# Patient Record
Sex: MALE | Race: WHITE | Employment: OTHER | URBAN - METROPOLITAN AREA
[De-identification: names, ages, dates, MRNs, and addresses within clinical notes are randomized per-mention and may not be internally consistent; named-entity substitution may affect disease eponyms.]

---

## 2017-12-22 NOTE — PATIENT DISCUSSION
- Has been on Elinore Gosling for years without issues. Stopped taking it 1 week ago when his prescription ran out.

## 2017-12-22 NOTE — PATIENT DISCUSSION
New Prescription: Zioptan (PF) (tafluprost (pf)): dropperette: 0.0015% 1 drop at bedtime as directed into both eyes 12-

## 2018-02-02 NOTE — PATIENT DISCUSSION
Continue: Zioptan (PF) (tafluprost (pf)): dropperette: 0.0015% 1 drop at bedtime as directed into both eyes 12-

## 2018-03-01 NOTE — PATIENT DISCUSSION
New Prescription: timolol maleate (timolol maleate): drops: 0.5% 1 drop twice a day as directed into both eyes 03-

## 2018-05-01 NOTE — PATIENT DISCUSSION
Continue: timolol maleate (timolol maleate): drops: 0.5% 1 drop twice a day as directed into both eyes 03-

## 2018-09-04 NOTE — PATIENT DISCUSSION
- Goal IOP: below 17 for now OU, essentially at goal, though if any signs of progression will target below 15

## 2018-12-20 NOTE — PATIENT DISCUSSION
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  RNFL appears slightly rotated temporally, so loss on OCT may be artifactual

## 2018-12-20 NOTE — PATIENT DISCUSSION
- Borderline visually and functionally significant, starting to have issues with contrast sensitivity at night

## 2019-05-10 NOTE — PATIENT DISCUSSION
Continue: timolol maleate (timolol maleate): drops, once daily: 0.5% 1 drop once a day as directed into affected eye

## 2019-05-10 NOTE — PATIENT DISCUSSION
Continue: Zioptan (PF) (tafluprost (pf)): dropperette: 0.0015% 1 drop at bedtime as directed into both eyes 01-

## 2019-07-03 ENCOUNTER — NEW PATIENT COMPREHENSIVE (OUTPATIENT)
Dept: URBAN - METROPOLITAN AREA CLINIC 38 | Facility: CLINIC | Age: 49
End: 2019-07-03

## 2019-07-03 DIAGNOSIS — H52.4: ICD-10-CM

## 2019-07-03 DIAGNOSIS — Z46.0: ICD-10-CM

## 2019-07-03 DIAGNOSIS — H52.13: ICD-10-CM

## 2019-07-03 PROCEDURE — 92015 DETERMINE REFRACTIVE STATE: CPT

## 2019-07-03 PROCEDURE — 92310PDW PREMIUM SPECIALTY DAILY WEAR

## 2019-07-03 PROCEDURE — 92310-3 LEVEL 3 CONTACT LENS MANAGEMENT

## 2019-07-03 PROCEDURE — 92004 COMPRE OPH EXAM NEW PT 1/>: CPT

## 2019-07-03 ASSESSMENT — VISUAL ACUITY
OS_SC: 20/400
OS_CC: 20/20
OD_CC: J8
OS_CC: J6
OS_SC: J1
OD_CC: 20/20
OD_SC: J1
OD_SC: 20/200

## 2019-07-03 ASSESSMENT — KERATOMETRY
OD_K1POWER_DIOPTERS: 46.25
OS_AXISANGLE2_DEGREES: 114
OS_K2POWER_DIOPTERS: 46.5
OS_AXISANGLE_DEGREES: 24
OD_AXISANGLE2_DEGREES: 76
OD_AXISANGLE_DEGREES: 166
OD_K2POWER_DIOPTERS: 46.5
OS_K1POWER_DIOPTERS: 46

## 2019-07-03 ASSESSMENT — TONOMETRY
OS_IOP_MMHG: 22
OD_IOP_MMHG: 21

## 2019-07-12 ENCOUNTER — CONTACT LENS FOLLOW UP (OUTPATIENT)
Dept: URBAN - METROPOLITAN AREA CLINIC 38 | Facility: CLINIC | Age: 49
End: 2019-07-12

## 2019-07-12 DIAGNOSIS — Z46.0: ICD-10-CM

## 2019-07-12 PROCEDURE — 92310F

## 2019-07-12 ASSESSMENT — KERATOMETRY
OD_K2POWER_DIOPTERS: 46.5
OD_K1POWER_DIOPTERS: 46.25
OD_AXISANGLE2_DEGREES: 76
OD_AXISANGLE_DEGREES: 166
OS_K2POWER_DIOPTERS: 46.5
OS_AXISANGLE2_DEGREES: 114
OS_AXISANGLE_DEGREES: 24
OS_K1POWER_DIOPTERS: 46

## 2019-07-12 ASSESSMENT — VISUAL ACUITY
OU_CC: 20/20
OU_CC: J1

## 2020-10-15 ENCOUNTER — ESTABLISHED COMPREHENSIVE EXAM (OUTPATIENT)
Dept: URBAN - METROPOLITAN AREA CLINIC 38 | Facility: CLINIC | Age: 50
End: 2020-10-15

## 2020-10-15 DIAGNOSIS — H52.202: ICD-10-CM

## 2020-10-15 DIAGNOSIS — Z97.3: ICD-10-CM

## 2020-10-15 DIAGNOSIS — H52.4: ICD-10-CM

## 2020-10-15 DIAGNOSIS — H52.13: ICD-10-CM

## 2020-10-15 PROCEDURE — 92014 COMPRE OPH EXAM EST PT 1/>: CPT

## 2020-10-15 PROCEDURE — 92310-3 LEVEL 3 CONTACT LENS MANAGEMENT

## 2020-10-15 PROCEDURE — 92015 DETERMINE REFRACTIVE STATE: CPT

## 2020-10-15 ASSESSMENT — KERATOMETRY
OS_K2POWER_DIOPTERS: 46.5
OS_AXISANGLE2_DEGREES: 114
OD_AXISANGLE_DEGREES: 166
OS_AXISANGLE_DEGREES: 24
OD_AXISANGLE2_DEGREES: 76
OD_K2POWER_DIOPTERS: 46.5
OD_K1POWER_DIOPTERS: 46.25
OS_K1POWER_DIOPTERS: 46

## 2020-10-15 ASSESSMENT — TONOMETRY
OS_IOP_MMHG: 20
OD_IOP_MMHG: 18

## 2020-10-15 ASSESSMENT — VISUAL ACUITY
OD_CC: J12
OS_CC: 20/25
OU_CC: J3
OU_CC: 20/20
OD_CC: 20/20
OS_CC: J4

## 2020-11-24 ENCOUNTER — CONTACT LENS FOLLOW UP (OUTPATIENT)
Dept: URBAN - METROPOLITAN AREA CLINIC 38 | Facility: CLINIC | Age: 50
End: 2020-11-24

## 2020-11-24 DIAGNOSIS — Z97.3: ICD-10-CM

## 2020-11-24 PROCEDURE — 92310F

## 2020-11-24 ASSESSMENT — KERATOMETRY
OS_AXISANGLE_DEGREES: 24
OD_K1POWER_DIOPTERS: 46.25
OS_AXISANGLE2_DEGREES: 114
OD_AXISANGLE_DEGREES: 166
OD_AXISANGLE2_DEGREES: 76
OD_K2POWER_DIOPTERS: 46.5
OS_K1POWER_DIOPTERS: 46
OS_K2POWER_DIOPTERS: 46.5

## 2020-11-24 ASSESSMENT — VISUAL ACUITY
OU_CC: 20/20
OD_CC: 20/20
OU_CC: J1
OD_CC: J6
OS_CC: 20/30
OS_CC: J2

## 2021-03-09 NOTE — PATIENT DISCUSSION
- Likely exacerbated by screen time.  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

## 2022-03-08 NOTE — PATIENT DISCUSSION
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  RNFL appears slightly rotated temporally, so loss on OCT may be artifactual.

## 2022-06-29 ENCOUNTER — COMPREHENSIVE EXAM (OUTPATIENT)
Dept: URBAN - METROPOLITAN AREA CLINIC 38 | Facility: CLINIC | Age: 52
End: 2022-06-29

## 2022-06-29 DIAGNOSIS — H52.4: ICD-10-CM

## 2022-06-29 DIAGNOSIS — Z97.3: ICD-10-CM

## 2022-06-29 DIAGNOSIS — H52.13: ICD-10-CM

## 2022-06-29 DIAGNOSIS — H52.202: ICD-10-CM

## 2022-06-29 PROCEDURE — 92015 DETERMINE REFRACTIVE STATE: CPT

## 2022-06-29 PROCEDURE — 92014 COMPRE OPH EXAM EST PT 1/>: CPT

## 2022-06-29 PROCEDURE — 92310-4 LEVEL 4 CONTACT LENS MANAGEMENT

## 2022-06-29 ASSESSMENT — KERATOMETRY
OD_K2POWER_DIOPTERS: 46.5
OS_AXISANGLE_DEGREES: 24
OS_AXISANGLE2_DEGREES: 114
OS_K1POWER_DIOPTERS: 46
OD_AXISANGLE_DEGREES: 166
OD_K1POWER_DIOPTERS: 46.25
OD_AXISANGLE2_DEGREES: 76
OS_K2POWER_DIOPTERS: 46.5

## 2022-06-29 ASSESSMENT — VISUAL ACUITY
OD_CC: J8
OU_CC: 20/20
OU_CC: J1
OS_CC: J1
OD_CC: 20/20
OS_CC: 20/25

## 2022-06-29 ASSESSMENT — TONOMETRY
OS_IOP_MMHG: 18
OD_IOP_MMHG: 18

## 2022-08-02 NOTE — PATIENT DISCUSSION
OCT with evidence of RNFL loss inferiorly OS>OD, no definite progression.  RNFL appears slightly rotated temporally, so loss on OCT may be artifactual.

## 2022-08-26 NOTE — PATIENT DISCUSSION
VISUALLY SIGNIFICANT: I have discussed the option of glasses versus cataract surgery versus following. It was explained that when the patients vision no longer meets their visual needs and a glasses prescription does not improve visual symptoms, the option of cataract surgery is a reasonable next step. It was explained that there is no guarantee that removing the cataract will improve their visual symptoms, however; it is believed that the cataract is contributing to the patient's visual impairment and surgery may improve both the visual and functional status of the patient. The risks, benefits and alternatives of surgery were discussed with the patient. After this discussion, the patient desires to proceed with cataract surgery with implantation of an intraocular lens to improve vision. No

## 2022-08-26 NOTE — PATIENT DISCUSSION
Patient wants best possible range of vision without glasses. Do not recommend PanOptix IOL due to history of glaucoma.

## 2022-10-18 NOTE — PATIENT DISCUSSION
Patient decided to postpone OS surgery until OD eye feels better. Will Follow up in 2-3 weeks for Surface check OD before moving forward with surgery OS.

## 2022-10-18 NOTE — PATIENT DISCUSSION
Good postoperative appearance. Excellent range of vision. No concerning issues on exam. Distance vision possibly mildly limited by dry eye, which is likely also causing the haloes. Do not think that these are IOL related.

## 2023-01-12 NOTE — PATIENT DISCUSSION
Pt states haloes are slowly improving OD, has not noticed them yet OS. Only happens with green and blue lights.

## 2023-06-30 ENCOUNTER — COMPREHENSIVE EXAM (OUTPATIENT)
Dept: URBAN - METROPOLITAN AREA CLINIC 38 | Facility: CLINIC | Age: 53
End: 2023-06-30

## 2023-06-30 DIAGNOSIS — H52.4: ICD-10-CM

## 2023-06-30 DIAGNOSIS — H52.202: ICD-10-CM

## 2023-06-30 DIAGNOSIS — H52.13: ICD-10-CM

## 2023-06-30 PROCEDURE — 92014 COMPRE OPH EXAM EST PT 1/>: CPT

## 2023-06-30 PROCEDURE — 92015 DETERMINE REFRACTIVE STATE: CPT

## 2023-06-30 ASSESSMENT — VISUAL ACUITY
OD_SC: 20/100
OS_CC: J1 CL
OS_CC: 20/25 CL
OD_CC: 20/20 CL
OD_SC: J1
OU_CC: 20/20
OU_CC: J1
OS_SC: J1
OD_CC: J8 CL
OS_SC: 20/200

## 2023-06-30 ASSESSMENT — KERATOMETRY
OS_AXISANGLE_DEGREES: 24
OD_AXISANGLE_DEGREES: 166
OD_AXISANGLE2_DEGREES: 76
OS_K1POWER_DIOPTERS: 46
OS_AXISANGLE2_DEGREES: 114
OD_K1POWER_DIOPTERS: 46.25
OS_K2POWER_DIOPTERS: 46.5
OD_K2POWER_DIOPTERS: 46.5

## 2023-06-30 ASSESSMENT — TONOMETRY
OS_IOP_MMHG: 18
OD_IOP_MMHG: 18

## 2024-09-24 ENCOUNTER — COMPREHENSIVE EXAM (OUTPATIENT)
Dept: URBAN - METROPOLITAN AREA CLINIC 38 | Facility: CLINIC | Age: 54
End: 2024-09-24

## 2024-09-24 DIAGNOSIS — H52.202: ICD-10-CM

## 2024-09-24 DIAGNOSIS — Z97.3: ICD-10-CM

## 2024-09-24 DIAGNOSIS — H52.4: ICD-10-CM

## 2024-09-24 DIAGNOSIS — H52.13: ICD-10-CM

## 2024-09-24 PROCEDURE — 92310-2 LEVEL 2 CONTACT LENS MANAGEMENT

## 2024-09-24 PROCEDURE — 92014 COMPRE OPH EXAM EST PT 1/>: CPT

## 2024-09-24 PROCEDURE — 92015 DETERMINE REFRACTIVE STATE: CPT
